# Patient Record
Sex: FEMALE | Race: WHITE
[De-identification: names, ages, dates, MRNs, and addresses within clinical notes are randomized per-mention and may not be internally consistent; named-entity substitution may affect disease eponyms.]

---

## 2019-10-05 NOTE — XMS
Clinical Summary
  Created on: 10/05/2019
 
 Trista Fajardo
 External Reference #: [id number]
 : [birth year]
 Sex: Female
 
 Demographics
 
 
+-----------------------+-----------------+
| Home Phone            | [phone] |
+-----------------------+-----------------+
| Preferred Language    | Unknown         |
+-----------------------+-----------------+
| Marital Status        | Unknown         |
+-----------------------+-----------------+
| Religion Affiliation | Unknown         |
+-----------------------+-----------------+
| Race                  | Unknown         |
+-----------------------+-----------------+
| Ethnic Group          | Unknown         |
+-----------------------+-----------------+
 
 
 Author
 
 
+--------------+--------------------------------------------+
| Author       | [facility]  |
|              | and [place]                                |
+--------------+--------------------------------------------+
| Organization | [facility]  |
|              | and [place]                                |
+--------------+--------------------------------------------+
| Address      | Unknown                                    |
+--------------+--------------------------------------------+
| Phone        | Unavailable                                |
+--------------+--------------------------------------------+
 
 
 
 Care Team Providers
 
 
+-----------------------+------+-------------+
| Care Team Member Name | Role | Phone       |
+-----------------------+------+-------------+
 PCP  | Unavailable |
+-----------------------+------+-------------+
 
 
 
 Allergies
 Not on File
 
 Medications
 
 Not on file
 
 Active Problems
 Not on file
 
 Social History
 
 
+----------------+-------+-----------+--------+------+
| Tobacco Use    | Types | Packs/Day | Years  | Date |
|                |       |           | Used   |      |
+----------------+-------+-----------+--------+------+
| Never Assessed |       |           |        |      |
+----------------+-------+-----------+--------+------+
 
 
 
+------------------+---------------+
| Sex Assigned at  | Date Recorded |
| Birth            |               |
+------------------+---------------+
| Not on file      |               |
+------------------+---------------+
 
 
 
+----------------+-------------+-------------+
| Job Start Date | Occupation  | Industry    |
+----------------+-------------+-------------+
| Not on file    | Not on file | Not on file |
+----------------+-------------+-------------+
 
 
 
+----------------+--------------+------------+
| Travel History | Travel Start | Travel End |
+----------------+--------------+------------+
 
 
 
+-------------------------------------+
| No recent travel history available. |
+-------------------------------------+
 
 
 
 Last Filed Vital Signs
 Not on file
 
 Plan of Treatment
 
 
+--------+-------------+-----------+----------------------+-------------+
| Date   | Type        | Specialty | Care Team            | Description |
+--------+-------------+-----------+----------------------+-------------+
| 10/25/ | Appointment | Radiology |   Mann Ontiveros,  |             |
|    |             |           | MD  [address] [facility]   |             |
|        |             |           | [address]  [place], [place] |             |
|        |             |           |  [id number] [phone] |             |
|        |             |           |   [phone] (Fax) |             |
 
+--------+-------------+-----------+----------------------+-------------+
 
 
 
+--------------------+-----------+------------------------+----------+
| Health Maintenance | Due Date  | Last Done              | Comments |
+--------------------+-----------+------------------------+----------+
| Vaccine: HPV (1 -  | 10/27/201 |                        |          |
| Female 3-dose      | 0         |                        |          |
| series)            |           |                        |          |
+--------------------+-----------+------------------------+----------+
| Cervical Cancer    | 10/27/201 |                        |          |
| Screening (Pap)    | 6         |                        |          |
+--------------------+-----------+------------------------+----------+
| Vaccine:           |  | 2016             |          |
| Dtap/Tdap/Td (2 -  | 6         |                        |          |
| Td)                |           |                        |          |
+--------------------+-----------+------------------------+----------+
| Vaccine: Influenza | Completed | 2019, 2016 |          |
+--------------------+-----------+------------------------+----------+
 
 
 
 Results
 Not on filefrom Last 3 Months
 
 Advance Directives
 Patient has advance care planning documents on file. For more information, please contact:[facility] [place] [address]

## 2019-10-05 NOTE — XMS
Clinical Summary
  Created on: 10/05/2019
 
 Trista Fajardo
 External Reference #: [id number]
 : [birth year]
 Sex: Female
 
 Demographics
 
 
+-----------------------+-----------------+
| Home Phone            | [phone] |
+-----------------------+-----------------+
| Preferred Language    | Unknown         |
+-----------------------+-----------------+
| Marital Status        | Unknown         |
+-----------------------+-----------------+
| Congregational Affiliation | Unknown         |
+-----------------------+-----------------+
| Race                  | Unknown         |
+-----------------------+-----------------+
| Ethnic Group          | Unknown         |
+-----------------------+-----------------+
 
 
 Author
 
 
+--------------+--------------------------------------------+
| Author       | [facility] [place]  |
|              | and [place]                                |
+--------------+--------------------------------------------+
| Organization | [facility]  |
|              | and [place]                                |
+--------------+--------------------------------------------+
| Address      | Unknown                                    |
+--------------+--------------------------------------------+
| Phone        | Unavailable                                |
+--------------+--------------------------------------------+
 
 
 
 Care Team Providers
 
 
+-----------------------+------+-------------+
| Care Team Member Name | Role | Phone       |
+-----------------------+------+-------------+
 PCP  | Unavailable |
+-----------------------+------+-------------+
 
 
 
 Allergies
 Not on File
 
 Medications
 
 Not on file
 
 Active Problems
 Not on file
 
 Social History
 
 
+----------------+-------+-----------+--------+------+
| Tobacco Use    | Types | Packs/Day | Years  | Date |
|                |       |           | Used   |      |
+----------------+-------+-----------+--------+------+
| Never Assessed |       |           |        |      |
+----------------+-------+-----------+--------+------+
 
 
 
+------------------+---------------+
| Sex Assigned at  | Date Recorded |
| Birth            |               |
+------------------+---------------+
| Not on file      |               |
+------------------+---------------+
 
 
 
+----------------+-------------+-------------+
| Job Start Date | Occupation  | Industry    |
+----------------+-------------+-------------+
| Not on file    | Not on file | Not on file |
+----------------+-------------+-------------+
 
 
 
+----------------+--------------+------------+
| Travel History | Travel Start | Travel End |
+----------------+--------------+------------+
 
 
 
+-------------------------------------+
| No recent travel history available. |
+-------------------------------------+
 
 
 
 Last Filed Vital Signs
 Not on file
 
 Plan of Treatment
 
 
+--------+-------------+-----------+----------------------+-------------+
| Date   | Type        | Specialty | Care Team            | Description |
+--------+-------------+-----------+----------------------+-------------+
| 10/25/ | Appointment | Radiology |   Mann Ontiveros,  |             |
|    |             |           | MD  [address] [facility]   |             |
|        |             |           | [address]  [place], [place] |             |
|        |             |           |  [id number] [phone] |             |
|        |             |           |   [phone] (Fax) |             |
 
+--------+-------------+-----------+----------------------+-------------+
 
 
 
+--------------------+-----------+------------------------+----------+
| Health Maintenance | Due Date  | Last Done              | Comments |
+--------------------+-----------+------------------------+----------+
| Vaccine: HPV (1 -  | 10/27/201 |                        |          |
| Female 3-dose      | 0         |                        |          |
| series)            |           |                        |          |
+--------------------+-----------+------------------------+----------+
| Cervical Cancer    | 10/27/201 |                        |          |
| Screening (Pap)    | 6         |                        |          |
+--------------------+-----------+------------------------+----------+
| Vaccine:           |  | 2016             |          |
| Dtap/Tdap/Td (2 -  | 6         |                        |          |
| Td)                |           |                        |          |
+--------------------+-----------+------------------------+----------+
| Vaccine: Influenza | Completed | 2019, 2016 |          |
+--------------------+-----------+------------------------+----------+
 
 
 
 Results
 Not on filefrom Last 3 Months
 
 Advance Directives
 Patient has advance care planning documents on file. For more information, please contact:[facility] [place] [address]

## 2019-10-05 NOTE — XMS
PreManage Notification: ASTON ORNELAS MRN:[id number]
 
Security Information
 
Security Events
No recent Security Events currently on file
 
 
 
CRITERIA MET
------------
- [facility] - 2 Visits in 30 Days
 
 
CARE PROVIDERS
-------------------------------------------------------------------------------------
[facility]     [facility]
 
PHONE: Unknown
-------------------------------------------------------------------------------------
 
[facility] has no Care Guidelines for this patient.
 
ECYNTHIA VISIT COUNT (12 MO.)
-------------------------------------------------------------------------------------
2 [facility]
-------------------------------------------------------------------------------------
TOTAL 2
-------------------------------------------------------------------------------------
NOTE: Visits indicate total known visits.
 
ED/UCC VISIT TRACKING (12 MO.)
-------------------------------------------------------------------------------------
10/05/2019 15:45
[facility]
 
TYPE: Emergency
 
COMPLAINT:
- APROX 17 WKS PREG, ABD PAIN
-------------------------------------------------------------------------------------
09/09/2019 06:25
[facility] [place]
 
TYPE: Emergency
 
COMPLAINT:
- PREGNANCY/VAGINAL BLEEDING
 
DIAGNOSES:
- Hemorrhage in early pregnancy, unspecified
- 13 weeks gestation of pregnancy
- Abnormal uterine and vaginal bleeding, unspecified
-------------------------------------------------------------------------------------
 
 
INPATIENT VISIT TRACKING (12 MO.)
No inpatient visits to display in this time frame
 
 
[email or web address]

## 2020-03-11 ENCOUNTER — HOSPITAL ENCOUNTER (INPATIENT)
Dept: HOSPITAL 46 - FBCO | Age: 25
LOS: 1 days | Discharge: HOME | End: 2020-03-12
Attending: OBSTETRICS & GYNECOLOGY | Admitting: OBSTETRICS & GYNECOLOGY
Payer: COMMERCIAL

## 2020-03-11 DIAGNOSIS — Z23: ICD-10-CM

## 2020-03-11 DIAGNOSIS — Z3A.39: ICD-10-CM

## 2020-03-11 PROCEDURE — 10907ZC DRAINAGE OF AMNIOTIC FLUID, THERAPEUTIC FROM PRODUCTS OF CONCEPTION, VIA NATURAL OR ARTIFICIAL OPENING: ICD-10-PCS | Performed by: OBSTETRICS & GYNECOLOGY

## 2020-03-11 PROCEDURE — 0KQM0ZZ REPAIR PERINEUM MUSCLE, OPEN APPROACH: ICD-10-PCS | Performed by: OBSTETRICS & GYNECOLOGY

## 2020-03-11 PROCEDURE — 3E0P7VZ INTRODUCTION OF HORMONE INTO FEMALE REPRODUCTIVE, VIA NATURAL OR ARTIFICIAL OPENING: ICD-10-PCS | Performed by: OBSTETRICS & GYNECOLOGY

## 2020-03-11 NOTE — PR
[facility]
                                    [address]
                                  [place], [place]  [address]
_________________________________________________________________________________________
                                                                 Signed   
 
 
===================================
Progress Notes IP
===================================
Datetime Report Generated by CPSHELLEY: 2020 04:44
   
PROGRESS NOTES:  [id number]
Impression:  Normal progression of labor; Reassuring fetal heart rate
Procedures:  Sterile Vag Exam
Plan:  Continue present management; Anesthesia consult; Anticipate Vaginal Delivery
Informed Consent Obtain:  Vaginal Delivery
VITAL SIGNS:  [id number]
Vital Signs:  Reviewed; Within Normal Limits
EXAM:  [id number]
Dilatation:  10.0
Effacement:  100
Station:  1
Uterine Contractions:  q 1-2 min
MEMBRANES:  [id number]
Membrane Status:  Bulging
Comments:  Anesthesia here to evaluate pt.  Ctxs very painful w/ contractions.  10cm +1. 
   Pt declines spinal and requesting AROM.  Anticipate  soon.
Fetus A:  [id number]
FHR Baseline:  135
Variability:  Moderate 6-25bpm
Accelerations:  15X15
Decelerations:  None
FHR Category:  Category I
Presentation:  Vertex
Comments on Fetus A:  No evidence of fetal metabolic acidosis
Fetus B:  [id number]
Signing Physician:  Libia Baker DO
 
 
Copies:                                
~
 
 
 
 
 
 
 
 
*Electronically Signed*  20  0444  LIBIA BAKER DO               
                                                                       
_________________________________________________________________________________________
PATIENT NAME:     ASTON ORNELAS                      
MEDICAL RECORD #: [id number]                     PROGRESS NOTE                 
          ACCT #: [id number]  
DATE OF BIRTH:   [birth year]                                       
PHYSICIAN:   LIBIA BAKER DO                      RPT #: [id number]
REPORT IS CONFIDENTIAL AND NOT TO BE RELEASED WITHOUT AUTHORIZATION

## 2020-03-12 PROCEDURE — 3E0234Z INTRODUCTION OF SERUM, TOXOID AND VACCINE INTO MUSCLE, PERCUTANEOUS APPROACH: ICD-10-PCS | Performed by: OBSTETRICS & GYNECOLOGY

## 2020-03-12 NOTE — PR
[facility]
                                    [address]
                                  [place], [place]  [address]
_________________________________________________________________________________________
                                                                 Signed   
 
 
===================================
PP Progress Notes
===================================
Datetime Report Generated by CPN: 2020 10:41
   
SUBJECTIVE:  [id number]
Pain:  Within normal limits
Nausea/Vomiting:  Denies
Flatus:  Yes
Bowel Movement:  No
Vital Signs:  [id number]
Vital Signs:  Reviewed; Within Normal Limits
POSTPARTUM EXAM:  [id number]
Cardiovascular:  Normal
Respiratory:  Normal
Abdomen/Uterus:  Normal
Lochia:  Normal
Vulva/Perineum:  Not Done
Breasts:  Not Done
CVA Tenderness:  Normal
Extremities:  Normal
 Incision:  Not Applicable
Breastfeeding Progress:  Normal
Exam Comments:  Fundus firm U-2 nontender
IMPRESSION/PLAN/PROCEDURES:  [id number]
Impression:  Normal postpartum progression
Plan:  Discharge
Progress Notes:  Pt seen and examined.  Doing well.  Ambulating, voiding, and tolerating
   full diet.  Pain and lochia minimal. Breastfeeding well.  No questions or concerns. 
   Desires d/c home.  D/C instructions reviewed in detail.  All questions answered.
Signing Physician:  Libia Baker DO
 
 
Copies:                                
~
 
 
 
 
 
 
 
 
*Electronically Signed*  20  1041  LIBIA BAKER DO               
                                                                       
_________________________________________________________________________________________
PATIENT NAME:     ASTON ORNELAS                      
MEDICAL RECORD #: [id number]                     PROGRESS NOTE                 
          ACCT #: [id number]  
DATE OF BIRTH:   [birth year]                                       
PHYSICIAN:   LIBIA BAKER DO                      RPT #: [id number]
REPORT IS CONFIDENTIAL AND NOT TO BE RELEASED WITHOUT AUTHORIZATION